# Patient Record
Sex: MALE | Race: OTHER | HISPANIC OR LATINO
[De-identification: names, ages, dates, MRNs, and addresses within clinical notes are randomized per-mention and may not be internally consistent; named-entity substitution may affect disease eponyms.]

---

## 2019-09-09 PROBLEM — Z00.00 ENCOUNTER FOR PREVENTIVE HEALTH EXAMINATION: Status: ACTIVE | Noted: 2019-09-09

## 2021-02-04 PROBLEM — Z87.448 HISTORY OF END STAGE RENAL DISEASE: Status: RESOLVED | Noted: 2021-02-04 | Resolved: 2021-02-04

## 2021-02-04 PROBLEM — Z86.79 HISTORY OF HYPERTENSION: Status: RESOLVED | Noted: 2021-02-04 | Resolved: 2021-02-04

## 2021-02-11 ENCOUNTER — APPOINTMENT (OUTPATIENT)
Dept: GASTROENTEROLOGY | Facility: CLINIC | Age: 56
End: 2021-02-11
Payer: MEDICARE

## 2021-02-11 VITALS
HEIGHT: 68 IN | TEMPERATURE: 96.9 F | OXYGEN SATURATION: 98 % | WEIGHT: 170 LBS | SYSTOLIC BLOOD PRESSURE: 120 MMHG | BODY MASS INDEX: 25.76 KG/M2 | HEART RATE: 65 BPM | DIASTOLIC BLOOD PRESSURE: 82 MMHG

## 2021-02-11 DIAGNOSIS — R19.5 OTHER FECAL ABNORMALITIES: ICD-10-CM

## 2021-02-11 DIAGNOSIS — Z87.448 PERSONAL HISTORY OF OTHER DISEASES OF URINARY SYSTEM: ICD-10-CM

## 2021-02-11 DIAGNOSIS — Z86.79 PERSONAL HISTORY OF OTHER DISEASES OF THE CIRCULATORY SYSTEM: ICD-10-CM

## 2021-02-11 PROCEDURE — 99204 OFFICE O/P NEW MOD 45 MIN: CPT

## 2021-02-11 NOTE — ASSESSMENT
[FreeTextEntry1] : Positive  cologuard / history colon polyps:  Colonoscopy scheduled\par \par Risks of the procedure including but not limited to bleeding / perforation / infection / anesthesia complication / missed polyp or lesion explained to the  patient . The patient expressed understanding and a desire to proceed with the procedure.\par \par Risk of not doing procedure includes but is not limited to missed or delayed diagnosis of colon cancer or other colonic pathology\par

## 2021-02-11 NOTE — HISTORY OF PRESENT ILLNESS
Recommend AREDS 2 multivitamin supplements. [de-identified] : SABIHA MARQUIS  is being evaluated at the request of Dr. Mando Stahl for an opinion re: positive COLOGUARD (1/8/21) \par \par Patient has ESRD on M/W/F HD\par \par \par

## 2021-02-11 NOTE — CONSULT LETTER
[Dear  ___] : Dear  [unfilled], [Consult Letter:] : I had the pleasure of evaluating your patient, [unfilled]. [Please see my note below.] : Please see my note below. [Consult Closing:] : Thank you very much for allowing me to participate in the care of this patient.  If you have any questions, please do not hesitate to contact me. [Sincerely,] : Sincerely, [FreeTextEntry3] : Christian Linda MD\par tel: [phone]\par fax: [phone]\par

## 2021-02-11 NOTE — HISTORY OF PRESENT ILLNESS
[de-identified] : SABIHA MARQUIS  is being evaluated at the request of Dr. Mando Stahl for an opinion re: positive COLOGUARD (1/8/21) \par \par Patient has ESRD on M/W/F HD\par \par \par

## 2021-02-11 NOTE — PHYSICAL EXAM
[General Appearance - Alert] : alert [General Appearance - In No Acute Distress] : in no acute distress [Sclera] : the sclera and conjunctiva were normal [Outer Ear] : the ears and nose were normal in appearance [Neck Appearance] : the appearance of the neck was normal [] : no respiratory distress [Abdomen Soft] : soft [FreeTextEntry1] : deferred pending colonoscopy [Abnormal Walk] : normal gait [No Focal Deficits] : no focal deficits [Skin Color & Pigmentation] : normal skin color and pigmentation [Oriented To Time, Place, And Person] : oriented to person, place, and time

## 2021-02-11 NOTE — PHYSICAL EXAM
[General Appearance - Alert] : alert [General Appearance - In No Acute Distress] : in no acute distress [Sclera] : the sclera and conjunctiva were normal [Outer Ear] : the ears and nose were normal in appearance [Neck Appearance] : the appearance of the neck was normal [] : no respiratory distress [Abdomen Soft] : soft [FreeTextEntry1] : deferred pending colonoscopy [Abnormal Walk] : normal gait [Skin Color & Pigmentation] : normal skin color and pigmentation [No Focal Deficits] : no focal deficits [Oriented To Time, Place, And Person] : oriented to person, place, and time

## 2021-02-11 NOTE — CONSULT LETTER
[Dear  ___] : Dear  [unfilled], [Consult Letter:] : I had the pleasure of evaluating your patient, [unfilled]. [Please see my note below.] : Please see my note below. [Consult Closing:] : Thank you very much for allowing me to participate in the care of this patient.  If you have any questions, please do not hesitate to contact me. [Sincerely,] : Sincerely, [FreeTextEntry3] : Christian Linda MD\par tel: [phone]\par fax: [phone]\par

## 2021-03-01 ENCOUNTER — RESULT REVIEW (OUTPATIENT)
Age: 56
End: 2021-03-01

## 2021-03-03 ENCOUNTER — RESULT REVIEW (OUTPATIENT)
Age: 56
End: 2021-03-03

## 2021-03-04 ENCOUNTER — APPOINTMENT (OUTPATIENT)
Dept: GASTROENTEROLOGY | Facility: HOSPITAL | Age: 56
End: 2021-03-04

## 2021-09-27 ENCOUNTER — RESULT REVIEW (OUTPATIENT)
Age: 56
End: 2021-09-27

## 2021-09-29 ENCOUNTER — RESULT REVIEW (OUTPATIENT)
Age: 56
End: 2021-09-29

## 2021-09-30 ENCOUNTER — APPOINTMENT (OUTPATIENT)
Dept: GASTROENTEROLOGY | Facility: HOSPITAL | Age: 56
End: 2021-09-30

## 2022-05-23 ENCOUNTER — RESULT REVIEW (OUTPATIENT)
Age: 57
End: 2022-05-23

## 2022-07-19 ENCOUNTER — TRANSCRIPTION ENCOUNTER (OUTPATIENT)
Age: 57
End: 2022-07-19

## 2023-01-19 ENCOUNTER — TRANSCRIPTION ENCOUNTER (OUTPATIENT)
Age: 58
End: 2023-01-19

## 2024-02-27 ENCOUNTER — TRANSCRIPTION ENCOUNTER (OUTPATIENT)
Age: 59
End: 2024-02-27

## 2024-03-08 ENCOUNTER — APPOINTMENT (OUTPATIENT)
Dept: GASTROENTEROLOGY | Facility: CLINIC | Age: 59
End: 2024-03-08
Payer: MEDICARE

## 2024-03-08 VITALS
BODY MASS INDEX: 25.76 KG/M2 | WEIGHT: 170 LBS | DIASTOLIC BLOOD PRESSURE: 80 MMHG | SYSTOLIC BLOOD PRESSURE: 120 MMHG | HEIGHT: 68 IN

## 2024-03-08 DIAGNOSIS — K59.09 OTHER CONSTIPATION: ICD-10-CM

## 2024-03-08 PROCEDURE — G2211 COMPLEX E/M VISIT ADD ON: CPT

## 2024-03-08 PROCEDURE — 99214 OFFICE O/P EST MOD 30 MIN: CPT

## 2024-03-08 RX ORDER — ISRADIPINE 5 MG/1
5 CAPSULE ORAL
Refills: 0 | Status: ACTIVE | COMMUNITY

## 2024-03-08 RX ORDER — NICARDIPINE HYDROCHLORIDE 20 MG/1
20 CAPSULE ORAL
Refills: 0 | Status: ACTIVE | COMMUNITY

## 2024-03-08 RX ORDER — CARVEDILOL 25 MG/1
25 TABLET, FILM COATED ORAL
Refills: 0 | Status: ACTIVE | COMMUNITY

## 2024-03-08 NOTE — ASSESSMENT
[FreeTextEntry1] : 1. History of colon polyp:  Colonoscopy scheduled  2. Constipation: increase water / fiber  Pertinent available records reviewed Risks of the procedure including but not limited to bleeding / perforation / infection / anesthesia complication / missed polyp or lesion explained to the  patient . The patient expressed understanding and a desire to proceed with the procedure.  Risk of not doing procedure includes but is not limited to missed or delayed diagnosis of gastrointestinal pathology.

## 2024-03-08 NOTE — CONSULT LETTER
[Dear  ___] : Dear  [unfilled], [Consult Letter:] : I had the pleasure of evaluating your patient, [unfilled]. [Please see my note below.] : Please see my note below. [Consult Closing:] : Thank you very much for allowing me to participate in the care of this patient.  If you have any questions, please do not hesitate to contact me. [FreeTextEntry3] : Christian Linda MD tel: [phone] fax: [phone] [Sincerely,] : Sincerely,

## 2024-03-08 NOTE — HISTORY OF PRESENT ILLNESS
[FreeTextEntry1] : SABIHA MARQUIS  is being evaluated at the request of  Dr. Stahl for an opinion re: h/o colon polyp  Denies nausea, vomiting, fever, chills, diarrhea, GERD.  Admits to occasional constipation  -Colonoscopy 9/2021: hemorrhoids / polyps / diverticulosis ( f/u in 3 years recommended)  -Colonoscopy 3/2021: hemorrhoids / diverticulosis / numerous polyp  ( 3 month f/u recommended)

## 2024-03-18 ENCOUNTER — RESULT REVIEW (OUTPATIENT)
Age: 59
End: 2024-03-18

## 2024-03-18 ENCOUNTER — APPOINTMENT (OUTPATIENT)
Dept: HEMATOLOGY ONCOLOGY | Facility: CLINIC | Age: 59
End: 2024-03-18
Payer: MEDICARE

## 2024-03-18 VITALS
SYSTOLIC BLOOD PRESSURE: 175 MMHG | OXYGEN SATURATION: 97 % | TEMPERATURE: 97 F | HEIGHT: 68 IN | BODY MASS INDEX: 23.97 KG/M2 | DIASTOLIC BLOOD PRESSURE: 99 MMHG | HEART RATE: 63 BPM | WEIGHT: 158.13 LBS | RESPIRATION RATE: 16 BRPM

## 2024-03-18 DIAGNOSIS — Z80.0 FAMILY HISTORY OF MALIGNANT NEOPLASM OF DIGESTIVE ORGANS: ICD-10-CM

## 2024-03-18 DIAGNOSIS — Z87.442 PERSONAL HISTORY OF URINARY CALCULI: ICD-10-CM

## 2024-03-18 PROCEDURE — 36415 COLL VENOUS BLD VENIPUNCTURE: CPT

## 2024-03-18 PROCEDURE — 99215 OFFICE O/P EST HI 40 MIN: CPT

## 2024-03-18 PROCEDURE — G2211 COMPLEX E/M VISIT ADD ON: CPT

## 2024-03-18 RX ORDER — LABETALOL HYDROCHLORIDE 200 MG/1
200 TABLET, FILM COATED ORAL
Refills: 0 | Status: DISCONTINUED | COMMUNITY
End: 2024-03-18

## 2024-03-18 RX ORDER — SEVELAMER CARBONATE 800 MG/1
0.8 POWDER, FOR SUSPENSION ORAL
Refills: 0 | Status: DISCONTINUED | COMMUNITY
End: 2024-03-18

## 2024-03-18 RX ORDER — AMLODIPINE BESYLATE 10 MG/1
10 TABLET ORAL
Refills: 0 | Status: DISCONTINUED | COMMUNITY
End: 2024-03-18

## 2024-03-18 NOTE — ASSESSMENT
[FreeTextEntry1] : # R renal mass hemorrhage s/p embolization discussed concern for RCC Recommend PET CT and CTA A/P to rule out thrombosis discussed case with Dr. Prieto and if imaging negative can consider surgery  # hgb 10.6 does have ESRD and elevated CRP  #dialysis managed per Dr. Srinivasan. discussed case with him. ok to do CTA  #h/o colon polyps - plan for cny reviewed note by Dr. Linda  RTC in 2 -3 weeks to review scans

## 2024-03-18 NOTE — HISTORY OF PRESENT ILLNESS
[de-identified] : Mr. Aleman is a 58-year-old male with past medical history of end stage renal disease on HD MWF followed by Dr. Srinivasan and hypertension who presents for initial consultation as hospital follow up for R renal mass.   He presented to [place] ED 2/2024 and was admitted for right renal mass found on CT abdomen pelvis with noted bleeding, mass.  Urology was consulted and stated no surgical intervention at this point but would recommend renal artery embolization by IR.  Gastroenterology was also consulted and stated no signs of GI bleed at this time but will follow as necessary.  Surgery was consulted and said no surgical intervention from their perspective.  Nephrology was consulted and established hemodialysis for patient in the hospital and agreed with possible embolization.    IR performed the renal embolization on 2/23/2024.  Following extubation after surgery patient was noted to be apneic and required further intubation was ultimately admitted to the ICU.    On 2/24/2024 patient was extubated and noted to be improving.  Patient received 4 units of packed red blood cells due to anemia. Hospital course complicated by epididymitis and patient started on Ciprofloxacin with noted improvement to symptoms.   From heme/onc standpoint, a Bone scan was performed due to suspicion of metastasis but was noted to be normal. He was recommended to have PET/CT outpatient which he did not have. CT urogram with urology did not have.   Scheduled for CNY 5/10/24   States he no longer has flank pain . DId have hematuria 3 days ago.

## 2024-04-01 ENCOUNTER — APPOINTMENT (OUTPATIENT)
Dept: HEMATOLOGY ONCOLOGY | Facility: CLINIC | Age: 59
End: 2024-04-01
Payer: MEDICARE

## 2024-04-01 VITALS
BODY MASS INDEX: 24.16 KG/M2 | WEIGHT: 159.44 LBS | SYSTOLIC BLOOD PRESSURE: 195 MMHG | TEMPERATURE: 98.2 F | OXYGEN SATURATION: 96 % | HEART RATE: 63 BPM | RESPIRATION RATE: 17 BRPM | HEIGHT: 68 IN | DIASTOLIC BLOOD PRESSURE: 107 MMHG

## 2024-04-01 VITALS — DIASTOLIC BLOOD PRESSURE: 100 MMHG | SYSTOLIC BLOOD PRESSURE: 180 MMHG

## 2024-04-01 PROCEDURE — 99213 OFFICE O/P EST LOW 20 MIN: CPT

## 2024-04-01 PROCEDURE — 36415 COLL VENOUS BLD VENIPUNCTURE: CPT

## 2024-04-01 RX ORDER — FOSINOPRIL SODIUM 40 MG/1
40 TABLET ORAL DAILY
Refills: 0 | Status: ACTIVE | COMMUNITY

## 2024-04-01 NOTE — REVIEW OF SYSTEMS
[Diarrhea: Grade 0] : Diarrhea: Grade 0 [Negative] : Allergic/Immunologic [Shortness Of Breath] : shortness of breath [FreeTextEntry6] : intermittent SOB [FreeTextEntry8] : blood in urine [FreeTextEntry7] : has been having intermittent loose stool

## 2024-04-01 NOTE — HISTORY OF PRESENT ILLNESS
[de-identified] : Mr. Aleman is a 58-year-old male with past medical history of end stage renal disease on HD MWF followed by Dr. Srinivasan and hypertension who presents for initial consultation as hospital follow up for R renal mass.   He presented to [place] ED 2/2024 and was admitted for right renal mass found on CT abdomen pelvis with noted bleeding, mass.  Urology was consulted and stated no surgical intervention at this point but would recommend renal artery embolization by IR.  Gastroenterology was also consulted and stated no signs of GI bleed at this time but will follow as necessary.  Surgery was consulted and said no surgical intervention from their perspective.  Nephrology was consulted and established hemodialysis for patient in the hospital and agreed with possible embolization.    IR performed the renal embolization on 2/23/2024.  Following extubation after surgery patient was noted to be apneic and required further intubation was ultimately admitted to the ICU.    On 2/24/2024 patient was extubated and noted to be improving.  Patient received 4 units of packed red blood cells due to anemia. Hospital course complicated by epididymitis and patient started on Ciprofloxacin with noted improvement to symptoms.   From heme/onc standpoint, a Bone scan was performed due to suspicion of metastasis but was noted to be normal. He was recommended to have PET/CT outpatient which he did not have. CT urogram with urology did not have.   Scheduled for CNY 5/10/24   States he no longer has flank pain . DId have hematuria 3 days ago.  [de-identified] : Pt is here for f/u visit  used: [id number] States he feels better than before States has been seeing intermittent blood in his urine since being in the hospital 3 weeks ago did not get PET CT or CTA

## 2024-04-01 NOTE — ASSESSMENT
[FreeTextEntry1] : # R renal mass hemorrhage s/p embolization discussed concern for RCC Recommend PET CT and CTA A/P to rule out thrombosis - Needs to receive discussed case with Dr. Prieto and if imaging negative can consider surgery plan to see urology 4/12 - he does not know name  # hgb 10.6 does have ESRD and elevated CRP  #dialysis managed per Dr. Srinivasan. discussed case with him. ok to do CTA  #h/o colon polyps - plan for cny reviewed note by Dr. Linda  #HTN  no HA, Dizziness, CP or SOB - advised that if he develops symtoms to go to ED Follows with nephro  RTC in 2 -3 weeks to review scans
Unknown if ever smoked

## 2024-04-12 ENCOUNTER — RESULT REVIEW (OUTPATIENT)
Age: 59
End: 2024-04-12

## 2024-04-24 ENCOUNTER — RESULT REVIEW (OUTPATIENT)
Age: 59
End: 2024-04-24

## 2024-04-24 ENCOUNTER — APPOINTMENT (OUTPATIENT)
Dept: HEMATOLOGY ONCOLOGY | Facility: CLINIC | Age: 59
End: 2024-04-24
Payer: MEDICARE

## 2024-04-24 VITALS
WEIGHT: 154 LBS | RESPIRATION RATE: 16 BRPM | HEIGHT: 68 IN | HEART RATE: 62 BPM | OXYGEN SATURATION: 96 % | TEMPERATURE: 97.3 F | DIASTOLIC BLOOD PRESSURE: 86 MMHG | BODY MASS INDEX: 23.34 KG/M2 | SYSTOLIC BLOOD PRESSURE: 167 MMHG

## 2024-04-24 DIAGNOSIS — Z86.010 PERSONAL HISTORY OF COLONIC POLYPS: ICD-10-CM

## 2024-04-24 DIAGNOSIS — I31.39 OTHER PERICARDIAL EFFUSION (NONINFLAMMATORY): ICD-10-CM

## 2024-04-24 DIAGNOSIS — D64.9 ANEMIA, UNSPECIFIED: ICD-10-CM

## 2024-04-24 PROCEDURE — 99215 OFFICE O/P EST HI 40 MIN: CPT

## 2024-04-24 RX ORDER — SEVELAMER CARBONATE 800 MG/1
800 TABLET, FILM COATED ORAL
Refills: 0 | Status: ACTIVE | COMMUNITY

## 2024-04-24 NOTE — HISTORY OF PRESENT ILLNESS
[de-identified] : Mr. Aleman is a 58-year-old male with past medical history of end stage renal disease on HD MWF followed by Dr. Srinivasan and hypertension who presents for initial consultation as hospital follow up for R renal mass.   He presented to [place] ED 2/2024 and was admitted for right renal mass found on CT abdomen pelvis with noted bleeding, mass.  Urology was consulted and stated no surgical intervention at this point but would recommend renal artery embolization by IR.  Gastroenterology was also consulted and stated no signs of GI bleed at this time but will follow as necessary.  Surgery was consulted and said no surgical intervention from their perspective.  Nephrology was consulted and established hemodialysis for patient in the hospital and agreed with possible embolization.    IR performed the renal embolization on 2/23/2024.  Following extubation after surgery patient was noted to be apneic and required further intubation was ultimately admitted to the ICU.    On 2/24/2024 patient was extubated and noted to be improving.  Patient received 4 units of packed red blood cells due to anemia. Hospital course complicated by epididymitis and patient started on Ciprofloxacin with noted improvement to symptoms.   From heme/onc standpoint, a Bone scan was performed due to suspicion of metastasis but was noted to be normal. He was recommended to have PET/CT outpatient which he did not have. CT urogram with urology did not have.   Scheduled for CNY 5/10/24   States he no longer has flank pain . DId have hematuria 3 days ago.  [de-identified] : Pt is here for f/u visit  used: Serge [id number] States overall feels better States has not seen blood in his urine for the last 2-3 days Got PET scan on 4/12/24 - results not reviewed with pt yet

## 2024-04-24 NOTE — ASSESSMENT
[FreeTextEntry1] : # R renal mass hemorrhage s/p embolization -discussed concern for RCC -PET CT -4/12/24 -  1. No FDG avid renal mass is identified. Please note that renal cell carcinoma is not always FDG avid. No evidence of FDG avid metastatic disease. Slight interval decrease in size of a large 12.5 cm right subcapsular hematoma at the right kidney with associated inflammatory uptake. Small volume ascites. 2. Small right pleural effusion. Small pericardial effusion. Cardiomegaly. -CTA A/P to rule out thrombosis - Needs to receive -discussed case with Dr. Prieto can consider surgery - he will reach out  #pericardial effusion echo  # hgb 11 does have ESRD and elevated CRP  #dialysis managed per Dr. Srinivasan. discussed case with him. ok to do CTA  #h/o colon polyps - plan for cny reviewed note by Dr. Linda  #HTN  no HA, Dizziness, CP or SOB - advised that if he develops symtoms to go to ED Follows with nephro  RTC in 4 weeks with cbc with diff, cmp, iron, ferritin, ESR/CRP, b12, folate

## 2024-04-24 NOTE — REVIEW OF SYSTEMS
[Shortness Of Breath] : shortness of breath [Diarrhea: Grade 0] : Diarrhea: Grade 0 [Negative] : Allergic/Immunologic [SOB on Exertion] : shortness of breath during exertion [FreeTextEntry6] : intermittent SOB [FreeTextEntry7] : has been having intermittent loose stool [FreeTextEntry8] : blood in urine, decrease in amount of urine

## 2024-05-09 ENCOUNTER — RESULT REVIEW (OUTPATIENT)
Age: 59
End: 2024-05-09

## 2024-05-10 ENCOUNTER — APPOINTMENT (OUTPATIENT)
Dept: GASTROENTEROLOGY | Facility: HOSPITAL | Age: 59
End: 2024-05-10

## 2024-05-15 ENCOUNTER — RESULT REVIEW (OUTPATIENT)
Age: 59
End: 2024-05-15

## 2024-05-31 ENCOUNTER — RESULT REVIEW (OUTPATIENT)
Age: 59
End: 2024-05-31

## 2024-06-10 ENCOUNTER — RESULT REVIEW (OUTPATIENT)
Age: 59
End: 2024-06-10

## 2024-06-10 ENCOUNTER — APPOINTMENT (OUTPATIENT)
Dept: HEMATOLOGY ONCOLOGY | Facility: CLINIC | Age: 59
End: 2024-06-10
Payer: MEDICARE

## 2024-06-10 VITALS
DIASTOLIC BLOOD PRESSURE: 66 MMHG | BODY MASS INDEX: 24.17 KG/M2 | TEMPERATURE: 97.1 F | RESPIRATION RATE: 16 BRPM | OXYGEN SATURATION: 96 % | WEIGHT: 159.5 LBS | HEIGHT: 68 IN | HEART RATE: 56 BPM | SYSTOLIC BLOOD PRESSURE: 111 MMHG

## 2024-06-10 DIAGNOSIS — N28.89 OTHER SPECIFIED DISORDERS OF KIDNEY AND URETER: ICD-10-CM

## 2024-06-10 PROCEDURE — 36415 COLL VENOUS BLD VENIPUNCTURE: CPT

## 2024-06-10 PROCEDURE — 99214 OFFICE O/P EST MOD 30 MIN: CPT | Mod: 25

## 2024-07-02 PROBLEM — N28.89 RENAL MASS, RIGHT: Status: ACTIVE | Noted: 2024-03-06

## 2024-07-02 NOTE — ASSESSMENT
[FreeTextEntry1] : #R renal mass hemorrhage s/p embolization - Discussed concern for RCC - PET CT 4/12/24 1. No FDG avid renal mass is identified. Please note that renal cell carcinoma is not always FDG avid. No evidence of FDG avid metastatic disease. Slight interval decrease in size of a large 12.5 cm right subcapsular hematoma at the right kidney with associated inflammatory uptake. Small volume ascites. 2. Small right pleural effusion. Small pericardial effusion. Cardiomegaly. - CTA A/P to rule out thrombosis - Discussed case with Dr. Prieto can consider surgery he will reach out - 6/10/24 vs and labs reviewed. Patient had follow up with Dr. Prieto team and had CT revealing Interval decrease in size of right renal capsule chronic hematoma which now measures 7 cm. Multiple cysts and stones as above. Urinary bladder is mostly collapsed and does not fill with intravenous contrast and is therefore not well evaluated. He had CTA with our team revealing  A 7 cm chronic hematoma along the right renal capsule, markedly decreased in size from 2/23/2024. No evidence of an underlying enhancing renal mass. Severe cardiomegaly. Heterogeneous liver enhancement with dilated hepatic vein/IVC, consistent with congestive hepatopathy.  Small volume ascites. Dr. Alaniz and I personally reviewed imaging with Radiolog Dr. Cordero and he compared imaging to 2/2024. Does not appear to be renal mass and given its decrease in size favors hematoma. Continue to monitor no intervention interval imaging in 3 mos. Reviewed with patient states he was advised this by Dr. Prieto team as well. Agrees with plan   #Pericardial effusion - s/p ECHO 5/2024 and also notes of cardiomegaly. Rec cardiology referral. He states he sees at [facility] advised follow up Dr. Warren  #Anemia - Does have ESRD and elevated CRP - Hgb increased to 11.5  #Dialysis - Managed per Dr. Srinivasan   #h/o colon polyps - Reviewed note by Dr. Linda - s/p CNY 5/2024   RTC in 3 mos with cbc with diff, cmp, iron, ferritin, ESR/CRP, b12, folate  Case and management discussed with Dr. Alaniz

## 2024-07-02 NOTE — REVIEW OF SYSTEMS
[SOB on Exertion] : shortness of breath during exertion [Diarrhea: Grade 0] : Diarrhea: Grade 0 [Negative] : Allergic/Immunologic [FreeTextEntry8] : blood in urine, decrease in amount of urine [Lower Ext Edema] : lower extremity edema [Shortness Of Breath] : no shortness of breath [Wheezing] : no wheezing [Cough] : no cough [Dysuria] : no dysuria [Incontinence] : no incontinence [FreeTextEntry6] : intermittent SOB [FreeTextEntry7] : has been having intermittent loose stool

## 2024-07-02 NOTE — REASON FOR VISIT
[Follow-Up Visit] : a follow-up [[employer] ] : provided by [employer]   [FreeTextEntry2] : renal mass [Interpreters_IDNumber] : [id number] [Interpreters_FullName] : Alicja  [TWNoteComboBox1] : [place]

## 2024-07-02 NOTE — HISTORY OF PRESENT ILLNESS
[de-identified] : Mr. Aleman is a 58-year-old male with past medical history of end stage renal disease on HD MWF followed by Dr. Srinivasan and hypertension who presents for initial consultation as hospital follow up for R renal mass.   He presented to [place] ED 2/2024 and was admitted for right renal mass found on CT abdomen pelvis with noted bleeding, mass.  Urology was consulted and stated no surgical intervention at this point but would recommend renal artery embolization by IR.  Gastroenterology was also consulted and stated no signs of GI bleed at this time but will follow as necessary.  Surgery was consulted and said no surgical intervention from their perspective.  Nephrology was consulted and established hemodialysis for patient in the hospital and agreed with possible embolization.    IR performed the renal embolization on 2/23/2024.  Following extubation after surgery patient was noted to be apneic and required further intubation was ultimately admitted to the ICU.    On 2/24/2024 patient was extubated and noted to be improving.  Patient received 4 units of packed red blood cells due to anemia. Hospital course complicated by epididymitis and patient started on Ciprofloxacin with noted improvement to symptoms.   From heme/onc standpoint, a Bone scan was performed due to suspicion of metastasis but was noted to be normal. He was recommended to have PET/CT outpatient which he did not have. CT urogram with urology did not have.   Scheduled for CNY 5/10/24   States he no longer has flank pain . DId have hematuria 3 days ago.  [de-identified] : Patient seen and examined today for routine follow up. He is remains on HD following with nephro. He is s/p CNY 5/10/24. He did follow with urology Dr. Tristan Salter and had CT along with CTA abd/pelvis that was ordered by our team. CT revealing  A 7 cm chronic hematoma along the right renal capsule, markedly decreased in size from 2/23/2024. No evidence of an underlying enhancing renal mass. Severe cardiomegaly. Heterogeneous liver enhancement with dilated hepatic vein/IVC, consistent with congestive hepatopathy.  Small volume ascites. He states he had follow up with Urology as well and was noted that he did not need further intervention as there was no renal mass and that can follow with interval imaging. Feels well. Chronic leg edema.

## 2024-09-11 ENCOUNTER — APPOINTMENT (OUTPATIENT)
Dept: HEMATOLOGY ONCOLOGY | Facility: CLINIC | Age: 59
End: 2024-09-11

## 2024-09-11 ENCOUNTER — RESULT REVIEW (OUTPATIENT)
Age: 59
End: 2024-09-11

## 2024-09-11 VITALS
OXYGEN SATURATION: 99 % | SYSTOLIC BLOOD PRESSURE: 150 MMHG | RESPIRATION RATE: 16 BRPM | DIASTOLIC BLOOD PRESSURE: 81 MMHG | HEART RATE: 52 BPM | TEMPERATURE: 97.1 F | WEIGHT: 159.19 LBS | HEIGHT: 68 IN | BODY MASS INDEX: 24.13 KG/M2

## 2024-09-11 DIAGNOSIS — Z86.010 PERSONAL HISTORY OF COLONIC POLYPS: ICD-10-CM

## 2024-09-11 DIAGNOSIS — N28.89 OTHER SPECIFIED DISORDERS OF KIDNEY AND URETER: ICD-10-CM

## 2024-09-11 DIAGNOSIS — D64.9 ANEMIA, UNSPECIFIED: ICD-10-CM

## 2024-09-11 DIAGNOSIS — T14.8XXA OTHER INJURY OF UNSPECIFIED BODY REGION, INITIAL ENCOUNTER: ICD-10-CM

## 2024-09-11 PROCEDURE — 99214 OFFICE O/P EST MOD 30 MIN: CPT

## 2024-09-11 NOTE — HISTORY OF PRESENT ILLNESS
[de-identified] : Mr. Aleman is a 58-year-old male with past medical history of end stage renal disease on HD MWF followed by Dr. Srinivasan and hypertension who presents for initial consultation as hospital follow up for R renal mass.   He presented to [place] ED 2/2024 and was admitted for right renal mass found on CT abdomen pelvis with noted bleeding, mass.  Urology was consulted and stated no surgical intervention at this point but would recommend renal artery embolization by IR.  Gastroenterology was also consulted and stated no signs of GI bleed at this time but will follow as necessary.  Surgery was consulted and said no surgical intervention from their perspective.  Nephrology was consulted and established hemodialysis for patient in the hospital and agreed with possible embolization.    IR performed the renal embolization on 2/23/2024.  Following extubation after surgery patient was noted to be apneic and required further intubation was ultimately admitted to the ICU.    On 2/24/2024 patient was extubated and noted to be improving.  Patient received 4 units of packed red blood cells due to anemia. Hospital course complicated by epididymitis and patient started on Ciprofloxacin with noted improvement to symptoms.   From heme/onc standpoint, a Bone scan was performed due to suspicion of metastasis but was noted to be normal. He was recommended to have PET/CT outpatient which he did not have. CT urogram with urology did not have.   Scheduled for CNY 5/10/24   States he no longer has flank pain . DId have hematuria 3 days ago.  [de-identified] : Patient seen and examined today for routine follow up. Using  Mati [id number]. He is remains on HD following with nephro. He is s/p CNY 5/10/24. He did follow with urology Dr. Tristan Salter and had CT along with CTA abd/pelvis that was ordered by our team. CT revealing  A 7 cm chronic hematoma along the right renal capsule, markedly decreased in size from 2/23/2024. No evidence of an underlying enhancing renal mass. Severe cardiomegaly. Heterogeneous liver enhancement with dilated hepatic vein/IVC, consistent with congestive hepatopathy.  Small volume ascites. He states he had follow up with Urology as well and was noted that he did not need further intervention as there was no renal mass and that can follow with interval imaging. Feels well. Chronic leg edema.

## 2024-09-11 NOTE — ASSESSMENT
[FreeTextEntry1] : #R renal hemorrhage s/p embolization - PET CT 4/12/24 1. No FDG avid renal mass is identified. Please note that renal cell carcinoma is not always FDG avid. No evidence of FDG avid metastatic disease. Slight interval decrease in size of a large 12.5 cm right subcapsular hematoma at the right kidney with associated inflammatory uptake. Small volume ascites. 2. Small right pleural effusion. Small pericardial effusion. Cardiomegaly. - CTA A/P to rule out thrombosis - Discussed case with Dr. Prieto can consider surgery he will reach out - 6/10/24 vs and labs reviewed. Patient had follow up with Dr. Prieto team and had CT revealing Interval decrease in size of right renal capsule chronic hematoma which now measures 7 cm. Multiple cysts and stones as above. Urinary bladder is mostly collapsed and does not fill with intravenous contrast and is therefore not well evaluated. He had CTA with our team revealing  A 7 cm chronic hematoma along the right renal capsule, markedly decreased in size from 2/23/2024. No evidence of an underlying enhancing renal mass. Severe cardiomegaly. Heterogeneous liver enhancement with dilated hepatic vein/IVC, consistent with congestive hepatopathy.  Small volume ascites. Dr. Alaniz and I personally reviewed imaging with Radiolog Dr. Cordero and he compared imaging to 2/2024. Does not appear to be renal mass and given its decrease in size favors hematoma. Continue to monitor no intervention interval imaging in 3 mos. Reviewed with patient states he was advised this by Dr. Prieto team as well. Agrees with plan  9/11/24  - vs and labs reviewed.  wbc, plts wnl. hgb 11 - known ESRD. Repeat CTA ABD/PEL (W)AW IC to ensure stability and will let Dr. Prieto know.  #Pericardial effusion - s/p ECHO 5/2024 and also notes of cardiomegaly. Rec cardiology referral. He states he sees at [facility] advised follow up Dr. Warren  #Anemia - Does have ESRD and elevated CRP - Hgb increased to 11.0  #Dialysis - Managed per Dr. Srinivasan   #h/o colon polyps - Reviewed note by Dr. Linda - s/p CNY 5/2024   RTC in 6 mos with cbc with diff, cmp, iron, ferritin, ESR/CRP, b12, folate

## 2024-09-11 NOTE — REVIEW OF SYSTEMS
[Lower Ext Edema] : lower extremity edema [SOB on Exertion] : shortness of breath during exertion [Diarrhea: Grade 0] : Diarrhea: Grade 0 [Negative] : Allergic/Immunologic [Shortness Of Breath] : no shortness of breath [Wheezing] : no wheezing [Cough] : no cough [Dysuria] : no dysuria [Incontinence] : no incontinence [FreeTextEntry6] : intermittent SOB [FreeTextEntry7] : has been having intermittent loose stool

## 2024-09-11 NOTE — HISTORY OF PRESENT ILLNESS
[de-identified] : Mr. Aleman is a 58-year-old male with past medical history of end stage renal disease on HD MWF followed by Dr. Srinivasan and hypertension who presents for initial consultation as hospital follow up for R renal mass.   He presented to [facility] ED 2/2024 and was admitted for right renal mass found on CT abdomen pelvis with noted bleeding, mass.  Urology was consulted and stated no surgical intervention at this point but would recommend renal artery embolization by IR.  Gastroenterology was also consulted and stated no signs of GI bleed at this time but will follow as necessary.  Surgery was consulted and said no surgical intervention from their perspective.  Nephrology was consulted and established hemodialysis for patient in the hospital and agreed with possible embolization.    IR performed the renal embolization on 2/23/2024.  Following extubation after surgery patient was noted to be apneic and required further intubation was ultimately admitted to the ICU.    On 2/24/2024 patient was extubated and noted to be improving.  Patient received 4 units of packed red blood cells due to anemia. Hospital course complicated by epididymitis and patient started on Ciprofloxacin with noted improvement to symptoms.   From heme/onc standpoint, a Bone scan was performed due to suspicion of metastasis but was noted to be normal. He was recommended to have PET/CT outpatient which he did not have. CT urogram with urology did not have.   Scheduled for CNY 5/10/24   States he no longer has flank pain . DId have hematuria 3 days ago.  [de-identified] : Patient seen and examined today for routine follow up. Using  Mati [id number]. He is remains on HD following with nephro. He is s/p CNY 5/10/24. He did follow with urology Dr. Tristan Salter and had CT along with CTA abd/pelvis that was ordered by our team. CT revealing  A 7 cm chronic hematoma along the right renal capsule, markedly decreased in size from 2/23/2024. No evidence of an underlying enhancing renal mass. Severe cardiomegaly. Heterogeneous liver enhancement with dilated hepatic vein/IVC, consistent with congestive hepatopathy.  Small volume ascites. He states he had follow up with Urology as well and was noted that he did not need further intervention as there was no renal mass and that can follow with interval imaging. Feels well. Chronic leg edema.

## 2024-09-11 NOTE — REASON FOR VISIT
[Follow-Up Visit] : a follow-up [[employer] ] : provided by [employer]   [FreeTextEntry2] : renal mass [Interpreters_IDNumber] : [id number] [Interpreters_FullName] : Alicja  [TWNoteComboBox1] : [place]uran

## 2024-09-11 NOTE — HISTORY OF PRESENT ILLNESS
[de-identified] : Mr. Aleman is a 58-year-old male with past medical history of end stage renal disease on HD MWF followed by Dr. Srinivasan and hypertension who presents for initial consultation as hospital follow up for R renal mass.   He presented to [facility] ED 2/2024 and was admitted for right renal mass found on CT abdomen pelvis with noted bleeding, mass.  Urology was consulted and stated no surgical intervention at this point but would recommend renal artery embolization by IR.  Gastroenterology was also consulted and stated no signs of GI bleed at this time but will follow as necessary.  Surgery was consulted and said no surgical intervention from their perspective.  Nephrology was consulted and established hemodialysis for patient in the hospital and agreed with possible embolization.    IR performed the renal embolization on 2/23/2024.  Following extubation after surgery patient was noted to be apneic and required further intubation was ultimately admitted to the ICU.    On 2/24/2024 patient was extubated and noted to be improving.  Patient received 4 units of packed red blood cells due to anemia. Hospital course complicated by epididymitis and patient started on Ciprofloxacin with noted improvement to symptoms.   From heme/onc standpoint, a Bone scan was performed due to suspicion of metastasis but was noted to be normal. He was recommended to have PET/CT outpatient which he did not have. CT urogram with urology did not have.   Scheduled for CNY 5/10/24   States he no longer has flank pain . DId have hematuria 3 days ago.  [de-identified] : Patient seen and examined today for routine follow up. Using  Mati [id number]. He is remains on HD following with nephro. He is s/p CNY 5/10/24. He did follow with urology Dr. Tristan Salter and had CT along with CTA abd/pelvis that was ordered by our team. CT revealing  A 7 cm chronic hematoma along the right renal capsule, markedly decreased in size from 2/23/2024. No evidence of an underlying enhancing renal mass. Severe cardiomegaly. Heterogeneous liver enhancement with dilated hepatic vein/IVC, consistent with congestive hepatopathy.  Small volume ascites. He states he had follow up with Urology as well and was noted that he did not need further intervention as there was no renal mass and that can follow with interval imaging. Feels well. Chronic leg edema.

## 2024-09-11 NOTE — REASON FOR VISIT
[Follow-Up Visit] : a follow-up [[employer] ] : provided by [employer]   [FreeTextEntry2] : renal mass [Interpreters_IDNumber] : [id number] [Interpreters_FullName] : Alicja  [TWNoteComboBox1] : Nauruan

## 2024-09-11 NOTE — BEGINNING OF VISIT
[1] : 1) Little interest or pleasure doing things for several days (1) [0] : 2) Feeling down, depressed, or hopeless: Not at all (0) [[id number]] : 1 [Pain Scale: ___] : On a scale of 1-10, today the patient's pain is a(n) [unfilled]. [Never] : Never [Date Discussed (MM/DD/YY): ___] : Discussed: [unfilled] [Patient/Caregiver unclear of wishes] : Patient/Caregiver unclear of wishes

## 2024-09-11 NOTE — REASON FOR VISIT
[Follow-Up Visit] : a follow-up [[employer] ] : provided by [employer]   [FreeTextEntry2] : renal mass [Interpreters_IDNumber] : [id number] [Interpreters_FullName] : Alicja  [TWNoteComboBox1] : Danish

## 2024-09-11 NOTE — BEGINNING OF VISIT
[1] : 1) Little interest or pleasure doing things for several days (1) [0] : 2) Feeling down, depressed, or hopeless: Not at all (0) [[id number]] : 1 [Pain Scale: ___] : On a scale of 1-10, today the patient's pain is a(n) [unfilled]. [Never] : Never [Date Discussed (MM/DD/YY): ___] : Discussed: [unfilled] [Patient/Caregiver unclear of wishes] : Patient/Caregiver unclear of wishes

## 2024-09-11 NOTE — ASSESSMENT
[FreeTextEntry1] : #R renal hemorrhage s/p embolization - PET CT 4/12/24 1. No FDG avid renal mass is identified. Please note that renal cell carcinoma is not always FDG avid. No evidence of FDG avid metastatic disease. Slight interval decrease in size of a large 12.5 cm right subcapsular hematoma at the right kidney with associated inflammatory uptake. Small volume ascites. 2. Small right pleural effusion. Small pericardial effusion. Cardiomegaly. - CTA A/P to rule out thrombosis - Discussed case with Dr. Prieto can consider surgery he will reach out - 6/10/24 vs and labs reviewed. Patient had follow up with Dr. Prieto team and had CT revealing Interval decrease in size of right renal capsule chronic hematoma which now measures 7 cm. Multiple cysts and stones as above. Urinary bladder is mostly collapsed and does not fill with intravenous contrast and is therefore not well evaluated. He had CTA with our team revealing  A 7 cm chronic hematoma along the right renal capsule, markedly decreased in size from 2/23/2024. No evidence of an underlying enhancing renal mass. Severe cardiomegaly. Heterogeneous liver enhancement with dilated hepatic vein/IVC, consistent with congestive hepatopathy.  Small volume ascites. Dr. Alaniz and I personally reviewed imaging with Radiolog Dr. Cordero and he compared imaging to 2/2024. Does not appear to be renal mass and given its decrease in size favors hematoma. Continue to monitor no intervention interval imaging in 3 mos. Reviewed with patient states he was advised this by Dr. Prieto team as well. Agrees with plan  9/11/24  - vs and labs reviewed.  wbc, plts wnl. hgb 11 - known ESRD. Repeat CTA ABD/PEL (W)AW IC to ensure stability and will let Dr. Prieto know.  #Pericardial effusion - s/p ECHO 5/2024 and also notes of cardiomegaly. Rec cardiology referral. He states he sees at [facility] advised follow up Dr. Warren  #Anemia - Does have ESRD and elevated CRP - Hgb increased to 11.0  #Dialysis - Managed per Dr. Srinivasan   #h/o colon polyps - Reviewed note by Dr. Linda - s/p CNY 5/2024   RTC in 6 mos with cbc with diff, cmp, iron, ferritin, ESR/CRP, b12, folate

## 2024-09-11 NOTE — BEGINNING OF VISIT
[1] : 1) Little interest or pleasure doing things for several days (1) [0] : 2) Feeling down, depressed, or hopeless: Not at all (0) [[id number]] : 1 [Pain Scale: ___] : On a scale of 1-10, today the patient's pain is a(n) [unfilled]. [Never] : Never [Date Discussed (MM/DD/YY): ___] : Discussed: [unfilled] [Patient/Caregiver unclear of wishes] : Patient/Caregiver unclear of wishes

## 2024-09-11 NOTE — REASON FOR VISIT
[Follow-Up Visit] : a follow-up [[employer] ] : provided by [employer]   [FreeTextEntry2] : renal mass [Interpreters_IDNumber] : [id number] [Interpreters_FullName] : Alicja  [TWNoteComboBox1] : Indonesian

## 2024-09-11 NOTE — ASSESSMENT
[FreeTextEntry1] : #R renal hemorrhage s/p embolization - PET CT 4/12/24 1. No FDG avid renal mass is identified. Please note that renal cell carcinoma is not always FDG avid. No evidence of FDG avid metastatic disease. Slight interval decrease in size of a large 12.5 cm right subcapsular hematoma at the right kidney with associated inflammatory uptake. Small volume ascites. 2. Small right pleural effusion. Small pericardial effusion. Cardiomegaly. - CTA A/P to rule out thrombosis - Discussed case with Dr. Prieto can consider surgery he will reach out - 6/10/24 vs and labs reviewed. Patient had follow up with Dr. Prieto team and had CT revealing Interval decrease in size of right renal capsule chronic hematoma which now measures 7 cm. Multiple cysts and stones as above. Urinary bladder is mostly collapsed and does not fill with intravenous contrast and is therefore not well evaluated. He had CTA with our team revealing  A 7 cm chronic hematoma along the right renal capsule, markedly decreased in size from 2/23/2024. No evidence of an underlying enhancing renal mass. Severe cardiomegaly. Heterogeneous liver enhancement with dilated hepatic vein/IVC, consistent with congestive hepatopathy.  Small volume ascites. Dr. Alaniz and I personally reviewed imaging with Radiolog Dr. Cordero and he compared imaging to 2/2024. Does not appear to be renal mass and given its decrease in size favors hematoma. Continue to monitor no intervention interval imaging in 3 mos. Reviewed with patient states he was advised this by Dr. Prieto team as well. Agrees with plan  9/11/24  - vs and labs reviewed.  wbc, plts wnl. hgb 11 - known ESRD. Repeat CTA ABD/PEL (W)AW IC to ensure stability and will let Dr. Prieto know.  #Pericardial effusion - s/p ECHO 5/2024 and also notes of cardiomegaly. Rec cardiology referral. He states he sees at [facility] advised follow up Dr. Warren  #Anemia - Does have ESRD and elevated CRP - Hgb increased to 11.0  #Dialysis - Managed per Dr. Srinivasan   #h/o colon polyps - Reviewed note by Dr. Linda - s/p CNY 5/2024   RTC in 6 mos with cbc with diff, cmp, iron, ferritin, ESR/CRP, b12, folate

## 2024-09-11 NOTE — BEGINNING OF VISIT
[1] : 1) Little interest or pleasure doing things for several days (1) [0] : 2) Feeling down, depressed, or hopeless: Not at all (0) [[id number]] : 1 [Pain Scale: ___] : On a scale of 1-10, today the patient's pain is a(n) [unfilled]. [Never] : Never [Date Discussed (MM/DD/YY): ___] : Discussed: [unfilled] [Patient/Caregiver unclear of wishes] : Patient/Caregiver unclear of wishes

## 2024-09-11 NOTE — HISTORY OF PRESENT ILLNESS
[de-identified] : Mr. Aleman is a 58-year-old male with past medical history of end stage renal disease on HD MWF followed by Dr. Srinivasan and hypertension who presents for initial consultation as hospital follow up for R renal mass.   He presented to [place] ED 2/2024 and was admitted for right renal mass found on CT abdomen pelvis with noted bleeding, mass.  Urology was consulted and stated no surgical intervention at this point but would recommend renal artery embolization by IR.  Gastroenterology was also consulted and stated no signs of GI bleed at this time but will follow as necessary.  Surgery was consulted and said no surgical intervention from their perspective.  Nephrology was consulted and established hemodialysis for patient in the hospital and agreed with possible embolization.    IR performed the renal embolization on 2/23/2024.  Following extubation after surgery patient was noted to be apneic and required further intubation was ultimately admitted to the ICU.    On 2/24/2024 patient was extubated and noted to be improving.  Patient received 4 units of packed red blood cells due to anemia. Hospital course complicated by epididymitis and patient started on Ciprofloxacin with noted improvement to symptoms.   From heme/onc standpoint, a Bone scan was performed due to suspicion of metastasis but was noted to be normal. He was recommended to have PET/CT outpatient which he did not have. CT urogram with urology did not have.   Scheduled for CNY 5/10/24   States he no longer has flank pain . DId have hematuria 3 days ago.  [de-identified] : Patient seen and examined today for routine follow up. Using  Mati [id number]. He is remains on HD following with nephro. He is s/p CNY 5/10/24. He did follow with urology Dr. Tristan Salter and had CT along with CTA abd/pelvis that was ordered by our team. CT revealing  A 7 cm chronic hematoma along the right renal capsule, markedly decreased in size from 2/23/2024. No evidence of an underlying enhancing renal mass. Severe cardiomegaly. Heterogeneous liver enhancement with dilated hepatic vein/IVC, consistent with congestive hepatopathy.  Small volume ascites. He states he had follow up with Urology as well and was noted that he did not need further intervention as there was no renal mass and that can follow with interval imaging. Feels well. Chronic leg edema.

## 2024-09-11 NOTE — ASSESSMENT
[FreeTextEntry1] : #R renal hemorrhage s/p embolization - PET CT 4/12/24 1. No FDG avid renal mass is identified. Please note that renal cell carcinoma is not always FDG avid. No evidence of FDG avid metastatic disease. Slight interval decrease in size of a large 12.5 cm right subcapsular hematoma at the right kidney with associated inflammatory uptake. Small volume ascites. 2. Small right pleural effusion. Small pericardial effusion. Cardiomegaly. - CTA A/P to rule out thrombosis - Discussed case with Dr. Prieto can consider surgery he will reach out - 6/10/24 vs and labs reviewed. Patient had follow up with Dr. Prieto team and had CT revealing Interval decrease in size of right renal capsule chronic hematoma which now measures 7 cm. Multiple cysts and stones as above. Urinary bladder is mostly collapsed and does not fill with intravenous contrast and is therefore not well evaluated. He had CTA with our team revealing  A 7 cm chronic hematoma along the right renal capsule, markedly decreased in size from 2/23/2024. No evidence of an underlying enhancing renal mass. Severe cardiomegaly. Heterogeneous liver enhancement with dilated hepatic vein/IVC, consistent with congestive hepatopathy.  Small volume ascites. Dr. Alaniz and I personally reviewed imaging with Radiolog Dr. Cordero and he compared imaging to 2/2024. Does not appear to be renal mass and given its decrease in size favors hematoma. Continue to monitor no intervention interval imaging in 3 mos. Reviewed with patient states he was advised this by Dr. Prieto team as well. Agrees with plan  9/11/24  - vs and labs reviewed.  wbc, plts wnl. hgb 11 - known ESRD. Repeat CTA ABD/PEL (W)AW IC to ensure stability and will let Dr. Prieto know.  #Pericardial effusion - s/p ECHO 5/2024 and also notes of cardiomegaly. Rec cardiology referral. He states he sees at [facility] advised follow up Dr. Warren  #Anemia - Does have ESRD and elevated CRP - Hgb increased to 11.0  #Dialysis - Managed per Dr. Srinivasan   #h/o colon polyps - Reviewed note by Dr. Linda - s/p CNY 5/2024   RTC in 6 mos with cbc with diff, cmp, iron, ferritin, ESR/CRP, b12, folate

## 2024-09-28 ENCOUNTER — TRANSCRIPTION ENCOUNTER (OUTPATIENT)
Age: 59
End: 2024-09-28

## 2025-01-02 ENCOUNTER — RESULT REVIEW (OUTPATIENT)
Age: 60
End: 2025-01-02

## 2025-01-04 ENCOUNTER — TRANSCRIPTION ENCOUNTER (OUTPATIENT)
Age: 60
End: 2025-01-04

## 2025-03-05 ENCOUNTER — NON-APPOINTMENT (OUTPATIENT)
Age: 60
End: 2025-03-05

## 2025-03-25 ENCOUNTER — RESULT REVIEW (OUTPATIENT)
Age: 60
End: 2025-03-25

## 2025-03-25 ENCOUNTER — APPOINTMENT (OUTPATIENT)
Dept: HEMATOLOGY ONCOLOGY | Facility: CLINIC | Age: 60
End: 2025-03-25

## 2025-03-25 VITALS
DIASTOLIC BLOOD PRESSURE: 78 MMHG | TEMPERATURE: 97.2 F | HEIGHT: 68 IN | HEART RATE: 55 BPM | SYSTOLIC BLOOD PRESSURE: 149 MMHG | OXYGEN SATURATION: 99 % | RESPIRATION RATE: 16 BRPM | WEIGHT: 157 LBS | BODY MASS INDEX: 23.79 KG/M2

## 2025-04-01 ENCOUNTER — RESULT REVIEW (OUTPATIENT)
Age: 60
End: 2025-04-01

## 2025-04-01 ENCOUNTER — APPOINTMENT (OUTPATIENT)
Dept: HEMATOLOGY ONCOLOGY | Facility: CLINIC | Age: 60
End: 2025-04-01
Payer: SELF-PAY

## 2025-04-01 VITALS
HEART RATE: 61 BPM | HEIGHT: 68 IN | RESPIRATION RATE: 16 BRPM | OXYGEN SATURATION: 98 % | BODY MASS INDEX: 25.05 KG/M2 | WEIGHT: 165.25 LBS | DIASTOLIC BLOOD PRESSURE: 98 MMHG | TEMPERATURE: 97.1 F | SYSTOLIC BLOOD PRESSURE: 185 MMHG

## 2025-04-01 DIAGNOSIS — D69.6 THROMBOCYTOPENIA, UNSPECIFIED: ICD-10-CM

## 2025-04-01 DIAGNOSIS — T14.8XXA OTHER INJURY OF UNSPECIFIED BODY REGION, INITIAL ENCOUNTER: ICD-10-CM

## 2025-04-01 DIAGNOSIS — N28.89 OTHER SPECIFIED DISORDERS OF KIDNEY AND URETER: ICD-10-CM

## 2025-04-01 DIAGNOSIS — D64.9 ANEMIA, UNSPECIFIED: ICD-10-CM

## 2025-04-01 PROCEDURE — 99204 OFFICE O/P NEW MOD 45 MIN: CPT

## 2025-04-11 DIAGNOSIS — E53.8 DEFICIENCY OF OTHER SPECIFIED B GROUP VITAMINS: ICD-10-CM

## 2025-04-11 DIAGNOSIS — R79.89 OTHER SPECIFIED ABNORMAL FINDINGS OF BLOOD CHEMISTRY: ICD-10-CM

## 2025-04-11 RX ORDER — MULTIVIT-MIN/FOLIC/VIT K/LYCOP 400-300MCG
1000 TABLET ORAL DAILY
Qty: 30 | Refills: 1 | Status: ACTIVE | COMMUNITY
Start: 2025-04-11 | End: 1900-01-01

## 2025-05-05 ENCOUNTER — APPOINTMENT (OUTPATIENT)
Dept: HEMATOLOGY ONCOLOGY | Facility: CLINIC | Age: 60
End: 2025-05-05

## 2025-06-10 RX ORDER — NIFEDIPINE 90 MG/1
90 TABLET, EXTENDED RELEASE ORAL
Qty: 180 | Refills: 3 | Status: ACTIVE | COMMUNITY
Start: 2025-06-10 | End: 1900-01-01